# Patient Record
Sex: FEMALE | Race: WHITE
[De-identification: names, ages, dates, MRNs, and addresses within clinical notes are randomized per-mention and may not be internally consistent; named-entity substitution may affect disease eponyms.]

---

## 2017-04-14 ENCOUNTER — HOSPITAL ENCOUNTER (OUTPATIENT)
Dept: HOSPITAL 53 - M WHC | Age: 57
End: 2017-04-14
Attending: OBSTETRICS & GYNECOLOGY
Payer: COMMERCIAL

## 2017-04-14 DIAGNOSIS — Z80.3: ICD-10-CM

## 2017-04-14 DIAGNOSIS — Z12.31: Primary | ICD-10-CM

## 2017-04-14 DIAGNOSIS — Z79.3: ICD-10-CM

## 2017-04-14 NOTE — REPMRS
Patient History

The patient states she had a clinical breast exam in 03/17

Patient is postmenopausal and is nulliparous.

Family history of breast cancer in mother at age 55.

Took hormonal contraceptives for 20 years.

 

Digital Woman Screen Mammo: April 14, 2017 - Exam #: 

[id number]

Bilateral CC and MLO view(s) were taken.

 

Technologist: Ni Yeh, Technologist

Prior study comparison: February 15, 2016, digital woman screen 

mammo performed at [facility].  January 19, 2015, 

digital woman screen mammo performed at [facility].

 

FINDINGS: There are scattered fibroglandular densities.  There 

has been no change in the appearance of the mammogram from the 

prior studies.  There is a mild amount of residual fibroglandular

tissue which is fairly symmetric. There is no interval 

development of dominant mass, architectural distortion, or 

clustered microcalcification suggestive of malignancy.

 

ASSESSMENT: BI-RADS/ACR category 1 mammogram. Negative.

 

Recommendation

Routine screening mammogram in 1 year (for women over age 40).

This mammogram was interpreted with the aid of an FDA-approved 

computer-aided dectection system.

 

Electronically Signed By: Danish Damon MD 04/14/17 9705

## 2018-05-10 ENCOUNTER — HOSPITAL ENCOUNTER (OUTPATIENT)
Dept: HOSPITAL 53 - M WHC | Age: 58
End: 2018-05-10
Attending: OBSTETRICS & GYNECOLOGY
Payer: COMMERCIAL

## 2018-05-10 DIAGNOSIS — Z12.31: Primary | ICD-10-CM

## 2018-05-10 DIAGNOSIS — Z80.3: ICD-10-CM

## 2018-05-10 DIAGNOSIS — Z78.0: ICD-10-CM

## 2018-05-10 PROCEDURE — 77067 SCR MAMMO BI INCL CAD: CPT

## 2019-08-27 ENCOUNTER — HOSPITAL ENCOUNTER (OUTPATIENT)
Dept: HOSPITAL 53 - M SFHCWAGY | Age: 59
End: 2019-08-27
Attending: NURSE PRACTITIONER
Payer: COMMERCIAL

## 2019-08-27 ENCOUNTER — HOSPITAL ENCOUNTER (OUTPATIENT)
Dept: HOSPITAL 53 - M WHC | Age: 59
End: 2019-08-27
Attending: NURSE PRACTITIONER
Payer: COMMERCIAL

## 2019-08-27 DIAGNOSIS — Z12.31: Primary | ICD-10-CM

## 2019-08-27 DIAGNOSIS — Z12.4: Primary | ICD-10-CM

## 2019-08-27 DIAGNOSIS — Z85.3: ICD-10-CM

## 2019-08-27 DIAGNOSIS — Z92.0: ICD-10-CM

## 2019-08-27 PROCEDURE — 87624 HPV HI-RISK TYP POOLED RSLT: CPT

## 2019-08-27 NOTE — REPMRS
Patient History

The patient states she had a clinical breast exam in 8/2019.

Family history of breast cancer at age 55 in mother.

Took hormonal contraceptives for 20 years.

 

3D TOMOSYNTHESIS WAS PERFORMED.

 

Digital Woman Screen Mammo: August 27, 2019 - Exam #: 

[id number]

Bilateral CC and MLO view(s) were taken.

 

Technologist: Ni Yeh, Technologist

Prior study comparison: May 10, 2018, digital woman screen mammo 

performed at [facility].  April 14, 2017, 

digital woman screen mammo performed at [facility] [employer] [facility] 

[facility].

 

FINDINGS: There are scattered fibroglandular densities.  There 

has been no change in the appearance of the mammogram from the 

prior studies.  There is a mild amount of residual fibroglandular

tissue which is fairly symmetric. There is no interval 

development of dominant mass, architectural distortion, or 

clustered microcalcification suggestive of malignancy.

 

Assessment: BI-RADS/ACR category 1 mammogram. Negative Mammogram.

 

Recommendation

Routine screening mammogram in 1 year (for women over age 40).

This mammogram was interpreted with the aid of an FDA-approved 

computer-aided dectection system.

 

THE LIFETIME RISK OF BREAST CANCER IS  20.9%, THEREFORE 

SUPPLEMENTAL SCREENING MRI OF THE BREASTS IS RECOMMENDED IN 6 

MONTHS.

 

Electronically Signed By: Danish Damon MD 08/27/19 7480

## 2019-08-28 LAB — HPV LOW VOL RFLX: (no result)

## 2020-08-28 ENCOUNTER — HOSPITAL ENCOUNTER (OUTPATIENT)
Dept: HOSPITAL 53 - M WHC | Age: 60
End: 2020-08-28
Attending: NURSE PRACTITIONER
Payer: COMMERCIAL

## 2020-08-28 DIAGNOSIS — Z12.4: ICD-10-CM

## 2020-08-28 DIAGNOSIS — Z80.3: ICD-10-CM

## 2020-08-28 DIAGNOSIS — Z12.31: Primary | ICD-10-CM

## 2020-08-28 DIAGNOSIS — N88.8: ICD-10-CM

## 2020-08-28 DIAGNOSIS — Z92.0: ICD-10-CM

## 2020-08-28 PROCEDURE — 77067 SCR MAMMO BI INCL CAD: CPT

## 2020-08-28 PROCEDURE — 77063 BREAST TOMOSYNTHESIS BI: CPT

## 2020-08-28 NOTE — REPMRS
Patient History

The patient states she had a clinical breast exam in Aug. 2020.

 

Family history of breast cancer at age 55 in mother.

Took hormonal contraceptives for 20 years.

 

Digital Woman Screen Mammo: August 28, 2020 - Exam #: 

[id number]

Bilateral CC and MLO view(s) were taken.

 

Technologist: Olive Velazquez, Technologist

Prior study comparison: August 27, 2019, bilateral digital woman 

screen mammo performed at [facility].  May 10, 2018, digital woman screen mammo performed 

at [facility].  April 14, 2017, digital woman screen mammo performed at [facility].

 

FINDINGS: There are scattered fibroglandular densities.  The 

Volpara volumetric breast density category is:B.  There has been 

no change in the appearance of the mammogram from the prior 

studies.  There is a mild amount of scattered fibroglandular 

density which is fairly symmetric. There is no interval 

development of dominant mass, architectural distortion, or 

grouped microcalcification suggestive of malignancy.

3-D tomosynthesis shows no additional findings.

 

Assessment: BI-RADS/ACR category 1 mammogram. Negative Mammogram.

 

Recommendation

Breast MRI of both breasts in 6 months.

Routine screening mammogram of both breasts in 1 year (for women 

over age 40).

This patient's Lifetime Breast Cancer Risk is estimated at 20.4 

%.

Annual screening Breast MRI scanniing is recommended for 

patient's whose lifetime risk assessment is over 20%.

This mammogram was interpreted with the aid of an FDA-approved 

computer-aided dectection system.

 

Electronically Signed By: Mateus Day MD 08/28/20 1452

## 2021-02-04 ENCOUNTER — HOSPITAL ENCOUNTER (OUTPATIENT)
Dept: HOSPITAL 53 - M RAD | Age: 61
End: 2021-02-04
Attending: NURSE PRACTITIONER
Payer: COMMERCIAL

## 2021-02-04 DIAGNOSIS — Z80.3: ICD-10-CM

## 2021-02-04 DIAGNOSIS — R92.2: Primary | ICD-10-CM

## 2021-02-04 DIAGNOSIS — Z91.89: ICD-10-CM

## 2021-02-05 NOTE — REP
INDICATION:

FAM HX OF BREAST CA, DENSE BREASTS.



COMPARISON:

Comparison mammography August 28, 2020.



TECHNIQUE:

Three Malissa MRI imaging was performed with a dedicated breast coil.  Axial, coronal,

and sagittal T1 and T2 weighted scans were obtained with and without fat saturation in

the usual fashion.  The study includes dynamically acquired post gadolinium-enhanced

imaging with image subtraction.  Maximum intensity projection and multi planar

reformation imaging is included as well.  This study is interpreted with the aid of

[employer], an FDA approved computer aided detection (CAD) software program, on a

dedicated breast MRI workstation.  The gadolinium enhancement dose is 10 mL of

intravenous ProHance.



FINDINGS:

There is a mild pattern of fibroglandular tissue present bilaterally.  No suspicious

morphologic abnormality is seen in either breast on T1 and T2 weighted high-resolution

images.  There is no evidence of axillary lymphadenopathy or cystic disease.

Dynamically acquired sequential postcontrast images show no suspicious focus of

enhancement and/or washout in either breast to suggest malignancy.  There is a minimal

pattern of background parenchymal enhancement.  Subtraction images are unremarkable.



IMPRESSION:

BI-RADS category 1-bilateral breast MRI findings.  Repeat screening breast MRI study

recommended in 1 year.





<Electronically signed by Mateus Day > 02/05/21 0872

## 2021-09-01 ENCOUNTER — HOSPITAL ENCOUNTER (OUTPATIENT)
Dept: HOSPITAL 53 - M WHC | Age: 61
End: 2021-09-01
Attending: NURSE PRACTITIONER
Payer: COMMERCIAL

## 2021-09-01 ENCOUNTER — HOSPITAL ENCOUNTER (OUTPATIENT)
Dept: HOSPITAL 53 - M SFHCWAGY | Age: 61
End: 2021-09-01
Attending: NURSE PRACTITIONER
Payer: COMMERCIAL

## 2021-09-01 DIAGNOSIS — Z12.31: Primary | ICD-10-CM

## 2021-09-01 DIAGNOSIS — N95.2: ICD-10-CM

## 2021-09-01 DIAGNOSIS — Z12.4: Primary | ICD-10-CM

## 2021-09-01 DIAGNOSIS — Z80.3: ICD-10-CM

## 2021-09-01 NOTE — REPMRS
Patient History

The patient states she had a clinical breast exam in September 2021.

Family history of breast cancer at age 55 in mother.

Took hormonal contraceptives for 20 years.

Patient states no breast complaints today. Patient has signed MRS

History Sheet.

 

Digital Woman Screen Mammo: September 1, 2021 - Exam #: 

[id number]

Bilateral CC and MLO view(s) were taken.

 

Technologist: Jody Stein, Technologist

Prior study comparison: August 28, 2020, bilateral digital woman 

screen mammo performed at [facility].  August 27, 2019, bilateral digital woman screen mammo 

performed at [facility].  May 10,

2018, digital woman screen mammo performed at [facility].

 

FINDINGS: There are scattered fibroglandular densities.  The 

Volpara volumetric breast density category is:B.  There has been 

no change in the appearance of the mammogram from the prior 

studies.  There is a mild amount of scattered fibroglandular 

density which is fairly symmetric. There is no interval 

development of dominant mass, architectural distortion, or 

grouped microcalcification suggestive of malignancy.

3-D tomosynthesis shows no additional findings.

 

Assessment: BI-RADS/ACR category 1 mammogram. Negative Mammogram.

 

Recommendation

Routine screening mammogram of both breasts in 1 year (for women 

over age 40).

This patient's  [facility] Lifetime Breast Cancer Risk is 

estimated at 19.7 %.

This mammogram was interpreted with the aid of an FDA-approved 

computer-aided dectection system.

 

Electronically Signed By: Mateus Day MD 09/01/21 2631

## 2022-09-19 ENCOUNTER — HOSPITAL ENCOUNTER (OUTPATIENT)
Dept: HOSPITAL 53 - M WHC | Age: 62
End: 2022-09-19
Attending: NURSE PRACTITIONER
Payer: COMMERCIAL

## 2022-09-19 ENCOUNTER — HOSPITAL ENCOUNTER (OUTPATIENT)
Dept: HOSPITAL 53 - M PLALAB | Age: 62
End: 2022-09-19
Attending: NURSE PRACTITIONER
Payer: COMMERCIAL

## 2022-09-19 DIAGNOSIS — Z12.4: Primary | ICD-10-CM

## 2022-09-19 DIAGNOSIS — Z12.31: Primary | ICD-10-CM

## 2022-09-19 PROCEDURE — 87624 HPV HI-RISK TYP POOLED RSLT: CPT
